# Patient Record
Sex: FEMALE | URBAN - METROPOLITAN AREA
[De-identification: names, ages, dates, MRNs, and addresses within clinical notes are randomized per-mention and may not be internally consistent; named-entity substitution may affect disease eponyms.]

---

## 2018-09-25 ENCOUNTER — APPOINTMENT (RX ONLY)
Dept: URBAN - METROPOLITAN AREA CLINIC 73 | Facility: CLINIC | Age: 28
Setting detail: DERMATOLOGY
End: 2018-09-25

## 2018-09-25 DIAGNOSIS — B35.3 TINEA PEDIS: ICD-10-CM

## 2018-09-25 DIAGNOSIS — B35.4 TINEA CORPORIS: ICD-10-CM

## 2018-09-25 DIAGNOSIS — B35.1 TINEA UNGUIUM: ICD-10-CM

## 2018-09-25 PROCEDURE — 99202 OFFICE O/P NEW SF 15 MIN: CPT

## 2018-09-25 PROCEDURE — ? PRESCRIPTION

## 2018-09-25 PROCEDURE — ? OTHER

## 2018-09-25 PROCEDURE — ? TREATMENT REGIMEN

## 2018-09-25 PROCEDURE — ? COUNSELING

## 2018-09-25 RX ORDER — KETOCONAZOLE 20 MG/G
CREAM TOPICAL
Qty: 1 | Refills: 11 | Status: ERX | COMMUNITY
Start: 2018-09-25

## 2018-09-25 RX ADMIN — KETOCONAZOLE: 20 CREAM TOPICAL at 19:31

## 2018-09-25 ASSESSMENT — LOCATION ZONE DERM
LOCATION ZONE: TOENAIL
LOCATION ZONE: LEG
LOCATION ZONE: FEET

## 2018-09-25 ASSESSMENT — LOCATION SIMPLE DESCRIPTION DERM
LOCATION SIMPLE: RIGHT GREAT TOE
LOCATION SIMPLE: LEFT GREAT TOE
LOCATION SIMPLE: RIGHT 2ND TOE
LOCATION SIMPLE: RIGHT FOOT
LOCATION SIMPLE: LEFT 2ND TOE
LOCATION SIMPLE: LEFT FOOT
LOCATION SIMPLE: RIGHT POSTERIOR THIGH

## 2018-09-25 ASSESSMENT — LOCATION DETAILED DESCRIPTION DERM
LOCATION DETAILED: LEFT GREAT TOENAIL
LOCATION DETAILED: RIGHT PROXIMAL LATERAL POSTERIOR THIGH
LOCATION DETAILED: RIGHT 2ND TOENAIL
LOCATION DETAILED: LEFT DORSAL FOOT
LOCATION DETAILED: RIGHT DORSAL FOOT
LOCATION DETAILED: RIGHT GREAT TOENAIL
LOCATION DETAILED: LEFT 2ND TOENAIL

## 2018-09-25 NOTE — PROCEDURE: TREATMENT REGIMEN
Plan: -Okay to use ketoconazole cream long term, as maintenance after 2 weeks can use every other day\\n-use antifungal spray and recommend vinegar soaks
Samples Given: Xolegel
Initiate Treatment: Ketoconazole cream 2% apply to affected areas on feet twice a day x 2 weeks
Detail Level: Zone
Otc Regimen: Vinegar soaks \\nAntifungal Spray, Spray Shoes every day as directed
Initiate Treatment: Ketoconazole cream 2% apply to affected areas twice a day x 2 weeks

## 2018-09-25 NOTE — HPI: RASH
How Severe Is Your Rash?: moderate
Is This A New Presentation, Or A Follow-Up?: Rash
Additional History: Patient is 8 weeks pregnant. She thinks she has toe nail fungus and feet fungus. Patient would do feet soaks but has stopped once she found out she was pregnant. She wants a treatment that will be safe for pregnancy.
Additional History: Spouse is present with patient. Patient has been using a cream but cannot remember the name of it. She has stopped using the cream since she found out she was pregnant.

## 2018-09-25 NOTE — PROCEDURE: OTHER
Note Text (......Xxx Chief Complaint.): This diagnosis correlates with the
Detail Level: Zone
Other (Free Text): -d/w pt best option is taking lamisil or Jublia\\n-Pt is pregnant, Pt cannot take lamisil or Jublia\\n-will continue to monitor, will treat after pregnancy\\n-fungus won’t affect the baby

## 2018-10-16 ENCOUNTER — APPOINTMENT (RX ONLY)
Dept: URBAN - METROPOLITAN AREA CLINIC 73 | Facility: CLINIC | Age: 28
Setting detail: DERMATOLOGY
End: 2018-10-16

## 2018-10-16 DIAGNOSIS — B35.1 TINEA UNGUIUM: ICD-10-CM | Status: INADEQUATELY CONTROLLED

## 2018-10-16 DIAGNOSIS — B35.4 TINEA CORPORIS: ICD-10-CM | Status: RESOLVING

## 2018-10-16 DIAGNOSIS — B35.3 TINEA PEDIS: ICD-10-CM | Status: RESOLVING

## 2018-10-16 PROCEDURE — ? PRESCRIPTION

## 2018-10-16 PROCEDURE — ? COUNSELING

## 2018-10-16 PROCEDURE — ? OTHER

## 2018-10-16 PROCEDURE — 99213 OFFICE O/P EST LOW 20 MIN: CPT

## 2018-10-16 PROCEDURE — ? TREATMENT REGIMEN

## 2018-10-16 RX ORDER — TERBINAFINE HYDROCHLORIDE 250 MG/1
TABLET ORAL
Qty: 14 | Refills: 0 | Status: ERX | COMMUNITY
Start: 2018-10-16

## 2018-10-16 RX ORDER — EFINACONAZOLE 100 MG/ML
SOLUTION TOPICAL
Qty: 1 | Refills: 2 | Status: ERX | COMMUNITY
Start: 2018-10-16

## 2018-10-16 RX ADMIN — EFINACONAZOLE: 100 SOLUTION TOPICAL at 14:16

## 2018-10-16 RX ADMIN — TERBINAFINE HYDROCHLORIDE: 250 TABLET ORAL at 14:15

## 2018-10-16 ASSESSMENT — LOCATION DETAILED DESCRIPTION DERM
LOCATION DETAILED: RIGHT 2ND TOENAIL
LOCATION DETAILED: RIGHT PROXIMAL LATERAL POSTERIOR THIGH
LOCATION DETAILED: RIGHT DORSAL FOOT
LOCATION DETAILED: LEFT GREAT TOENAIL
LOCATION DETAILED: LEFT DORSAL FOOT
LOCATION DETAILED: RIGHT GREAT TOENAIL
LOCATION DETAILED: LEFT 2ND TOENAIL

## 2018-10-16 ASSESSMENT — LOCATION SIMPLE DESCRIPTION DERM
LOCATION SIMPLE: RIGHT 2ND TOE
LOCATION SIMPLE: RIGHT POSTERIOR THIGH
LOCATION SIMPLE: RIGHT GREAT TOE
LOCATION SIMPLE: RIGHT FOOT
LOCATION SIMPLE: LEFT FOOT
LOCATION SIMPLE: LEFT GREAT TOE
LOCATION SIMPLE: LEFT 2ND TOE

## 2018-10-16 ASSESSMENT — LOCATION ZONE DERM
LOCATION ZONE: TOENAIL
LOCATION ZONE: FEET
LOCATION ZONE: LEG

## 2018-10-16 NOTE — PROCEDURE: TREATMENT REGIMEN
Otc Regimen: Vinegar soaks \\nAntifungal Spray, Spray Shoes every day as directed
Detail Level: Zone
Plan: -take lamisil for two weeks \\n-continue Ketoconazole cream 2% apply to affected areas on feet every day to every other day
Continue Regimen: Ketoconazole cream 2% apply to affected areas twice a day x 2 weeks
Plan: -use jubila every day for up to 48 weeks \\n—discontinue if pregnant

## 2018-10-16 NOTE — PROCEDURE: OTHER
Other (Free Text): -d/w pt best option is taking lamisil or Jublia\\n-discussed pulsing lamisil one week out of every month \\n—it takes 9 months for smaller toenail and one year for big nail \\n-Pt to try jubila every day for 48 weeks \\n-Pt still trying to get pregnant so will not do pulsing lamisil
Detail Level: Zone
Note Text (......Xxx Chief Complaint.): This diagnosis correlates with the
Other (Free Text): -d/w pt that oral lamisil for two weeks should clear up the fungus but have little effect on the nail\\n-ketoconazole cream will be used for maintenance \\n-Pt to follow up in a month

## 2018-10-16 NOTE — HPI: INFECTION (TINEA)
How Severe Is Your Dermatophytosis?: moderate
Is This A New Presentation, Or A Follow-Up?: Follow Up Tinea Corporis
Additional History: Pt has had a fungal infection on her feet and nails for the last month. Pt has seen an improvement with ketoconazole but it is not completely clear

## 2018-11-15 ENCOUNTER — APPOINTMENT (RX ONLY)
Dept: URBAN - METROPOLITAN AREA CLINIC 73 | Facility: CLINIC | Age: 28
Setting detail: DERMATOLOGY
End: 2018-11-15

## 2018-11-15 DIAGNOSIS — B35.1 TINEA UNGUIUM: ICD-10-CM

## 2018-11-15 DIAGNOSIS — B35.3 TINEA PEDIS: ICD-10-CM

## 2018-11-15 PROCEDURE — ? PRESCRIPTION

## 2018-11-15 PROCEDURE — ? OTHER

## 2018-11-15 PROCEDURE — 99213 OFFICE O/P EST LOW 20 MIN: CPT

## 2018-11-15 PROCEDURE — ? COUNSELING

## 2018-11-15 PROCEDURE — ? TREATMENT REGIMEN

## 2018-11-15 RX ORDER — TERBINAFINE HYDROCHLORIDE 250 MG/1
TABLET ORAL
Qty: 14 | Refills: 0 | Status: ERX

## 2018-11-15 ASSESSMENT — LOCATION SIMPLE DESCRIPTION DERM
LOCATION SIMPLE: RIGHT GREAT TOE
LOCATION SIMPLE: RIGHT FOOT
LOCATION SIMPLE: LEFT FOOT
LOCATION SIMPLE: LEFT 2ND TOE
LOCATION SIMPLE: LEFT GREAT TOE
LOCATION SIMPLE: RIGHT 2ND TOE

## 2018-11-15 ASSESSMENT — LOCATION DETAILED DESCRIPTION DERM
LOCATION DETAILED: LEFT DORSAL FOOT
LOCATION DETAILED: RIGHT GREAT TOENAIL
LOCATION DETAILED: LEFT GREAT TOENAIL
LOCATION DETAILED: RIGHT 2ND TOENAIL
LOCATION DETAILED: RIGHT DORSAL FOOT
LOCATION DETAILED: LEFT 2ND TOENAIL

## 2018-11-15 ASSESSMENT — LOCATION ZONE DERM
LOCATION ZONE: TOENAIL
LOCATION ZONE: FEET

## 2018-11-15 NOTE — HPI: INFECTION (TINEA)
How Severe Is Your Dermatophytosis?: moderate
Is This A New Presentation, Or A Follow-Up?: Follow Up Tinea Pedis
Additional History: Patient has completed lamisil tablets for 2 weeks and reported no se’s. Patient did see improvement with the lamisil and would like a refill.

## 2018-11-15 NOTE — HPI: INFECTION (ONYCHOMYCOSIS)
How Severe Is It?: moderate
Is This A New Presentation, Or A Follow-Up?: Follow Up Onychomycosis
Additional History: Patient has been trying Jublia daily and reports seeing no significant improvement. Spouse is present with patient today and reports he has seen improvement with the patient’s toe nails.

## 2018-11-15 NOTE — PROCEDURE: TREATMENT REGIMEN
Detail Level: Zone
Continue Regimen: Ketoconazole cream 2% apply to affected areas on feet every day to every other day
Otc Regimen: Antifungal Spray, Spray Shoes every day as directed\\nVinegar Soaks every night
Initiate Treatment: Lamisil 250mg take one tablet every day for one week out of the month x 2 months
Plan: -will discontinue Jublia if pregnant
Continue Regimen: Jublia apply to affected nails daily

## 2018-11-15 NOTE — PROCEDURE: OTHER
Detail Level: Zone
Other (Free Text): -patient and spouse report stopped trying to get pregnant for the next 1-2 months\\nTreatment options: pulse dosing for 2 months or use daily x 3 months and testing for LFTs\\nPatient will try pulse dosing for 2 months\\n-Informed patient to use antifungal spray in shoes and is safe to use during pregnancy\\n-Ketoconazole cream is safe to use during pregnancy
Note Text (......Xxx Chief Complaint.): This diagnosis correlates with the
Other (Free Text): -can continue Jublia \\n-Informed patient jublia has not been tested on pregnant patients \\n-Pt will d/c when pregnant.

## 2019-01-31 RX ORDER — EFINACONAZOLE 100 MG/ML
SOLUTION TOPICAL
Qty: 1 | Refills: 6 | Status: ERX

## 2019-04-18 ENCOUNTER — APPOINTMENT (RX ONLY)
Dept: URBAN - METROPOLITAN AREA CLINIC 73 | Facility: CLINIC | Age: 29
Setting detail: DERMATOLOGY
End: 2019-04-18

## 2019-04-18 DIAGNOSIS — B35.3 TINEA PEDIS: ICD-10-CM | Status: IMPROVED

## 2019-04-18 DIAGNOSIS — B35.1 TINEA UNGUIUM: ICD-10-CM | Status: IMPROVED

## 2019-04-18 PROCEDURE — 99213 OFFICE O/P EST LOW 20 MIN: CPT

## 2019-04-18 PROCEDURE — ? OTHER

## 2019-04-18 PROCEDURE — ? TREATMENT REGIMEN

## 2019-04-18 PROCEDURE — ? COUNSELING

## 2019-04-18 ASSESSMENT — LOCATION SIMPLE DESCRIPTION DERM
LOCATION SIMPLE: LEFT FOOT
LOCATION SIMPLE: RIGHT FOOT
LOCATION SIMPLE: RIGHT GREAT TOE
LOCATION SIMPLE: LEFT GREAT TOE
LOCATION SIMPLE: LEFT 2ND TOE
LOCATION SIMPLE: RIGHT 2ND TOE

## 2019-04-18 ASSESSMENT — LOCATION ZONE DERM
LOCATION ZONE: TOENAIL
LOCATION ZONE: FEET

## 2019-04-18 ASSESSMENT — LOCATION DETAILED DESCRIPTION DERM
LOCATION DETAILED: RIGHT 2ND TOENAIL
LOCATION DETAILED: RIGHT GREAT TOENAIL
LOCATION DETAILED: LEFT DORSAL FOOT
LOCATION DETAILED: LEFT GREAT TOENAIL
LOCATION DETAILED: LEFT 2ND TOENAIL
LOCATION DETAILED: RIGHT DORSAL FOOT

## 2019-04-18 NOTE — PROCEDURE: OTHER
Other (Free Text): - discussed that pt can still use ketoconazole 2% cream but discussed that pt does not need it at this point\\n- discussed that if Pt starts to get sweaty feet in the summer then she can continue with ketoconazole 2% cream at that point\\n- discussed pt can still spray shoes \\n- discussed that feet look completely clear today \\nRTC 1 year
Detail Level: Zone
Note Text (......Xxx Chief Complaint.): This diagnosis correlates with the
Other (Free Text): - Pt is 10 weeks pregnant and discontinued jublia, but before stopping she reports much improvement

## 2019-04-18 NOTE — PROCEDURE: TREATMENT REGIMEN
Detail Level: Zone
Continue Regimen: Ketoconazole cream 2% apply to affected areas on feet every day to every other day, Pt can discontinue for now, but use again in summer if feet become sweaty.
Otc Regimen: Antifungal Spray, Spray Shoes every day as directed\\nVinegar Soaks every night
Samples Given: Jublia

## 2019-04-18 NOTE — PROCEDURE: MIPS QUALITY
Quality 130: Documentation Of Current Medications In The Medical Record: Current Medications Documented
Quality 110: Preventive Care And Screening: Influenza Immunization: Influenza immunization was not ordered or administered, reason not given
Quality 131: Pain Assessment And Follow-Up: Pain assessment using a standardized tool is documented as negative, no follow-up plan required
Detail Level: Detailed